# Patient Record
Sex: MALE | Race: BLACK OR AFRICAN AMERICAN | NOT HISPANIC OR LATINO | Employment: FULL TIME | URBAN - METROPOLITAN AREA
[De-identification: names, ages, dates, MRNs, and addresses within clinical notes are randomized per-mention and may not be internally consistent; named-entity substitution may affect disease eponyms.]

---

## 2024-06-04 ENCOUNTER — HOSPITAL ENCOUNTER (EMERGENCY)
Facility: HOSPITAL | Age: 29
Discharge: HOME | End: 2024-06-04
Attending: EMERGENCY MEDICINE
Payer: COMMERCIAL

## 2024-06-04 VITALS
DIASTOLIC BLOOD PRESSURE: 95 MMHG | HEART RATE: 89 BPM | TEMPERATURE: 98 F | SYSTOLIC BLOOD PRESSURE: 153 MMHG | OXYGEN SATURATION: 100 % | RESPIRATION RATE: 18 BRPM | WEIGHT: 257.94 LBS

## 2024-06-04 DIAGNOSIS — R21 RASH AND NONSPECIFIC SKIN ERUPTION: Primary | ICD-10-CM

## 2024-06-04 PROCEDURE — 63600175 PHARM REV CODE 636 W HCPCS: Performed by: NURSE PRACTITIONER

## 2024-06-04 PROCEDURE — 99284 EMERGENCY DEPT VISIT MOD MDM: CPT

## 2024-06-04 RX ORDER — METHYLPREDNISOLONE 4 MG/1
TABLET ORAL
Qty: 21 EACH | Refills: 0 | Status: SHIPPED | OUTPATIENT
Start: 2024-06-04

## 2024-06-04 RX ORDER — PREDNISONE 10 MG/1
20 TABLET ORAL
Status: COMPLETED | OUTPATIENT
Start: 2024-06-04 | End: 2024-06-04

## 2024-06-04 RX ORDER — HYDROXYZINE HYDROCHLORIDE 25 MG/1
25 TABLET, FILM COATED ORAL 4 TIMES DAILY PRN
Qty: 28 TABLET | Refills: 0 | Status: SHIPPED | OUTPATIENT
Start: 2024-06-04 | End: 2024-06-11

## 2024-06-04 RX ADMIN — PREDNISONE 20 MG: 10 TABLET ORAL at 05:06

## 2024-06-04 NOTE — Clinical Note
"Steven Beyjody" Awilda was seen and treated in our emergency department on 6/4/2024.  He may return to work on 06/06/2024.       If you have any questions or concerns, please don't hesitate to call.      Carlos Mays Jr., FNP"

## 2024-06-04 NOTE — ED PROVIDER NOTES
Encounter Date: 6/4/2024       History     Chief Complaint   Patient presents with    Rash     ITCHY RASH TO CHEST AFTER SPRAYING AXE DEODERANT.      Pt is a 29 y.o. male who presents to the [facility] ED complaining of a rash to his chest after he sprayed the area with a new fragrance of axe body spray on Friday. Says symptoms began later that night and have continued until today. Reports symptoms were improving until he went to work and got sweaty which worsened the area. Denies swelling to lips/tongue, SOB, fever, chest pain, abdominal pain, or loss of bowel or bladder control.       Review of patient's allergies indicates:  No Known Allergies  History reviewed. No pertinent past medical history.  History reviewed. No pertinent surgical history.  No family history on file.  Social History     Tobacco Use    Smoking status: Never    Smokeless tobacco: Never     Review of Systems   Constitutional:  Negative for chills, diaphoresis, fatigue and fever.   HENT:  Negative for facial swelling, postnasal drip, rhinorrhea, sinus pressure, sinus pain, sore throat and trouble swallowing.    Respiratory:  Negative for cough, chest tightness, shortness of breath and wheezing.    Cardiovascular:  Negative for chest pain, palpitations and leg swelling.   Gastrointestinal:  Negative for abdominal pain, diarrhea, nausea and vomiting.   Genitourinary:  Negative for dysuria, flank pain, hematuria and urgency.   Musculoskeletal:  Negative for arthralgias, back pain and myalgias.   Skin:  Positive for rash. Negative for color change.   Neurological:  Negative for dizziness, syncope, weakness and headaches.   Hematological:  Does not bruise/bleed easily.   All other systems reviewed and are negative.      Physical Exam     Initial Vitals [06/04/24 1640]   BP Pulse Resp Temp SpO2   (!) 153/95 89 18 97.9 °F (36.6 °C) 100 %      MAP       --         Physical Exam    Nursing note and vitals reviewed.  Constitutional: Vital signs are normal. He  appears well-developed and well-nourished.   HENT:   Head: Normocephalic.   Nose: Nose normal.   Mouth/Throat: Oropharynx is clear and moist.   Eyes: Conjunctivae and EOM are normal. Pupils are equal, round, and reactive to light.   Neck: Neck supple.   Normal range of motion.  Cardiovascular:  Normal rate, regular rhythm, normal heart sounds and intact distal pulses.           Pulmonary/Chest: Effort normal and breath sounds normal. No respiratory distress. He has no wheezes. He has no rhonchi. He has no rales. He exhibits no tenderness.   Abdominal: Abdomen is soft and flat. Bowel sounds are normal. There is no abdominal tenderness. There is no rebound, no guarding, no tenderness at McBurney's point and negative Cook's sign.   Musculoskeletal:         General: Normal range of motion.      Cervical back: Normal range of motion and neck supple.     Neurological: He is alert and oriented to person, place, and time. He has normal strength.   Skin: Skin is warm and dry. Capillary refill takes less than 2 seconds. Rash noted.        Psychiatric: He has a normal mood and affect. His behavior is normal. Judgment and thought content normal.         ED Course   Procedures  Labs Reviewed - No data to display       Imaging Results    None          Medications   predniSONE tablet 20 mg (has no administration in time range)     Medical Decision Making  Differential:  Contact dermatitis  Skin rash  Allergic reaction               ED Course as of 06/04/24 1656 Tue Jun 04, 2024 1655 Given strict ED return precautions. I have spoken with the patient and/or caregivers. I have explained the patient's condition, diagnoses and treatment plan based on the information available to me at this time. I have answered the patient's and/or caregiver's questions and addressed any concerns. The patient and/or caregivers have as good an understanding of the patient's diagnosis, condition and treatment plan as can be expected at this point.  The vital signs have been stable. The patient's condition is stable and appropriate for discharge from the emergency department.      The patient will pursue further outpatient evaluation with the primary care physician or other designated or consulting physician as outlined in the discharge instructions. The patient and/or caregivers are agreeable to this plan of care and follow-up instructions have been explained in detail. The patient and/or caregivers have received these instructions in written format and have expressed an understanding of the discharge instructions. The patient and/or caregivers are aware that any significant change in condition or worsening of symptoms should prompt an immediate return to this or the closest emergency department or a call to 911.   [JA]      ED Course User Index  [JA] Carlos Mays Jr., FNP                           Clinical Impression:  Final diagnoses:  [R21] Rash and nonspecific skin eruption (Primary)          ED Disposition Condition    Discharge Stable          ED Prescriptions       Medication Sig Dispense Start Date End Date Auth. Provider    hydrOXYzine HCL (ATARAX) 25 MG tablet Take 1 tablet (25 mg total) by mouth 4 (four) times daily as needed for Itching. 28 tablet 6/4/2024 6/11/2024 Carlos Mays Jr., FNP    methylPREDNISolone (MEDROL DOSEPACK) 4 mg tablet use as directed 21 each 6/4/2024 -- Carlos Mays Jr., FNP          Follow-up Information       Follow up With Specialties Details Why Contact Info    [facility] Emergency Medicine In 3 days As needed, If symptoms worsen [address] [facility] [address] [phone]             Carlos Mays Jr., FNP  06/04/24 8256

## 2024-06-25 ENCOUNTER — OFFICE VISIT (OUTPATIENT)
Dept: URGENT CARE | Facility: CLINIC | Age: 29
End: 2024-06-25
Attending: STUDENT IN AN ORGANIZED HEALTH CARE EDUCATION/TRAINING PROGRAM
Payer: COMMERCIAL

## 2024-06-25 ENCOUNTER — HOSPITAL ENCOUNTER (EMERGENCY)
Facility: HOSPITAL | Age: 29
Discharge: HOME | End: 2024-06-25
Attending: STUDENT IN AN ORGANIZED HEALTH CARE EDUCATION/TRAINING PROGRAM
Payer: COMMERCIAL

## 2024-06-25 VITALS
TEMPERATURE: 99 F | WEIGHT: 257.94 LBS | OXYGEN SATURATION: 100 % | TEMPERATURE: 98 F | DIASTOLIC BLOOD PRESSURE: 83 MMHG | HEART RATE: 88 BPM | HEIGHT: 71 IN | RESPIRATION RATE: 18 BRPM | HEART RATE: 94 BPM | WEIGHT: 255.75 LBS | BODY MASS INDEX: 36.93 KG/M2 | SYSTOLIC BLOOD PRESSURE: 145 MMHG | OXYGEN SATURATION: 99 % | BODY MASS INDEX: 35.81 KG/M2 | RESPIRATION RATE: 18 BRPM | SYSTOLIC BLOOD PRESSURE: 140 MMHG | HEIGHT: 70 IN | DIASTOLIC BLOOD PRESSURE: 80 MMHG

## 2024-06-25 DIAGNOSIS — R30.0 DYSURIA: Primary | ICD-10-CM

## 2024-06-25 DIAGNOSIS — Z00.00 ENCOUNTER FOR MEDICAL EXAMINATION TO ESTABLISH CARE: ICD-10-CM

## 2024-06-25 DIAGNOSIS — Z11.3 SCREENING FOR STD (SEXUALLY TRANSMITTED DISEASE): Primary | ICD-10-CM

## 2024-06-25 DIAGNOSIS — Z20.2 STD EXPOSURE: ICD-10-CM

## 2024-06-25 DIAGNOSIS — Z13.9 ENCOUNTER FOR MEDICAL SCREENING EXAMINATION: ICD-10-CM

## 2024-06-25 LAB
C TRACH DNA SPEC QL NAA+PROBE: NOT DETECTED
N GONORRHOEA DNA SPEC QL NAA+PROBE: NOT DETECTED
SOURCE (OHS): NORMAL

## 2024-06-25 PROCEDURE — 87491 CHLMYD TRACH DNA AMP PROBE: CPT

## 2024-06-25 PROCEDURE — 87591 N.GONORRHOEAE DNA AMP PROB: CPT

## 2024-06-25 PROCEDURE — 87661 TRICHOMONAS VAGINALIS AMPLIF: CPT

## 2024-06-25 PROCEDURE — 99213 OFFICE O/P EST LOW 20 MIN: CPT | Mod: S$PBB,,,

## 2024-06-25 PROCEDURE — 99281 EMR DPT VST MAYX REQ PHY/QHP: CPT | Mod: 27

## 2024-06-25 PROCEDURE — 99214 OFFICE O/P EST MOD 30 MIN: CPT | Mod: PBBFAC

## 2024-06-25 NOTE — PROGRESS NOTES
"Subjective:       Patient ID: Steven Kaiser is a 29 y.o. male.    Vitals:  height is 5' 10" (1.778 m) and weight is 117 kg (257 lb 15 oz). His temperature is 98.6 °F (37 °C). His blood pressure is 145/83 (abnormal) and his pulse is 94. His respiration is 18 and oxygen saturation is 99%.     Chief Complaint: std screen and Referral (REQUESTS PCP)    29-year-old male reports to the clinic requesting a referral for primary care provider to establish care, and he would like STD screening at this time.  Patient states that on Sunday night while he was working he had burning with urination, but burning subsided in a few hours and was gone by Monday morning.  Patient states he has been exposed to an STD in the past although he is unsure which 1, but was given an antibiotic by mouth for treatment and has not had any problems since.  Discussed testing patient's urine for gonorrhea, chlamydia, and Trichomonas.  Patient agrees to this plan and does not wish to have hepatitis, syphilis, or HIV testing via blood done at today's visit.    All other systems are negative    Chart reviewed    Objective:   Physical Exam   Constitutional: He appears well-developed.  Non-toxic appearance. He does not appear ill. No distress.   Cardiovascular: Regular rhythm and normal heart sounds.   Pulmonary/Chest: Effort normal and breath sounds normal.   Abdominal: Bowel sounds are normal. He exhibits no distension. Soft. There is no abdominal tenderness.   Musculoskeletal: Normal range of motion.         General: Normal range of motion.   Skin: Skin is warm, dry and not diaphoretic.   Nursing note and vitals reviewed.        Assessment:     1. Screening for STD (sexually transmitted disease)    2. Encounter for medical examination to establish care            Plan:   Take medicines exactly as prescribed.  If your doctor prescribed antibiotics, take them as directed. Don't stop taking them just because you feel better. You need to take the full " course of antibiotics.  Tell your sex partner(s) that they will need treatment. For certain STIs, your doctor may be able to prescribe treatment for your partner(s) also.  Don't have sexual contact while you have symptoms of an STI or are being treated for an STI.  Don't douche. Douching changes the normal balance of bacteria in your vagina. It may increase the risk of spreading the infection to your uterus or other reproductive organs.    Here are some ways to help prevent STIs.  Limit your sex partners. Sex with one partner who has sex only with you can reduce your risk of getting an STI.  Talk with your partner or partners about STIs before you have sex. Find out if they are at risk for an STI. Remember that it's possible to have an STI and not know it.  Wait to have sex with new partners until you've each been tested.  Don't have sex if you have symptoms of an infection or if you are being treated for an STI.  Use a condom every time you have sex. Condoms are the only form of birth control that also helps prevent STIs.  Don't share sex toys. But if you do share them, use a condom and clean the sex toys between each use.  Vaccines are available for some STIs, such as HPV. Ask your doctor for more information.      Seek immediate medical care if:  You have new belly or pelvic pain.  You have a fever.  You have new or increased burning or pain with urination, or you cannot urinate.  You have pain, swelling, or tenderness in the scrotum.  You are pregnant and have any symptoms of an STI.  Watch closely for changes in your health, and be sure to contact your doctor or nurse advice line if:  You have unusual vaginal bleeding.  You have a discharge from the vagina, penis, or anus.  You have any new symptoms, such as sores, bumps, rashes, blisters, or warts in the genital or anal area.  You have itching, tingling, pain, or burning in the genital or anal area.  You think you may have been exposed to an STI.  You have a  sore throat or sores in your mouth or on your tongue.      Screening for STD (sexually transmitted disease)  -     Chlamydia/GC, PCR  -     Trichomonas vaginalis Amplified RNA    Encounter for medical examination to establish care  -     Ambulatory referral/consult to Family Practice        Please note: This chart was completed via voice to text dictation. It may contain typographical/word recognition errors. If there are any questions, please contact the provider for final clarification.       Curettage Text: The wound bed was treated with curettage after the biopsy was performed.

## 2024-06-25 NOTE — ED PROVIDER NOTES
Encounter Date: 6/25/2024       History     Chief Complaint   Patient presents with    Dysuria     PT REPORTS DYSURIA LAST PM, NOT THIS AM.  NO URETHRAL DC BUT UNSURE IF EXPOSED TO STD.       Patient dysuria intermittently for about 1 week. Today he has no symptoms besides intermittent penile shaft pain. He has a possible exposure to an STD.     The history is provided by the patient. No  was used.     Review of patient's allergies indicates:  No Known Allergies  History reviewed. No pertinent past medical history.  History reviewed. No pertinent surgical history.  No family history on file.  Social History     Tobacco Use    Smoking status: Never    Smokeless tobacco: Never     Review of Systems   Constitutional:  Negative for fever.   HENT:  Negative for sore throat.    Respiratory:  Negative for shortness of breath.    Cardiovascular:  Negative for chest pain.   Gastrointestinal:  Negative for nausea.   Genitourinary:  Positive for dysuria.   Musculoskeletal:  Negative for back pain.   Skin:  Negative for rash.   Neurological:  Negative for weakness.   Hematological:  Does not bruise/bleed easily.       Physical Exam     Initial Vitals [06/25/24 1006]   BP Pulse Resp Temp SpO2   (!) 142/90 101 16 97.9 °F (36.6 °C) 100 %      MAP       --         Physical Exam    Nursing note and vitals reviewed.  Constitutional: He appears well-developed and well-nourished. No distress.   HENT:   Head: Normocephalic and atraumatic.   Eyes: Pupils are equal, round, and reactive to light.   Neck: Neck supple.   Normal range of motion.  Cardiovascular:  Normal rate, regular rhythm, normal heart sounds and intact distal pulses.           Pulmonary/Chest: Breath sounds normal.   Abdominal: Abdomen is soft. Bowel sounds are normal.   Musculoskeletal:         General: Normal range of motion.      Cervical back: Normal range of motion and neck supple.     Neurological: He is alert and oriented to person, place, and  time. He has normal strength. GCS score is 15. GCS eye subscore is 4. GCS verbal subscore is 5. GCS motor subscore is 6.   Skin: Skin is warm and dry.   Psychiatric: Thought content normal.         ED Course   Procedures  Labs Reviewed - No data to display       Imaging Results    None          Medications - No data to display  Medical Decision Making  Pateint had MSE and is deemed medically stable. He was referred to urgent care for UA and STD testing    Risk  Risk Details: Risk Details: Given strict ED return precautions. I have spoken with the patient and/or caregivers. I have explained the patient's condition, diagnoses and treatment plan based on the information available to me at this time. I have answered the patient's and/or caregiver's questions and addressed any concerns. The patient and/or caregivers have as good an understanding of the patient's diagnosis, condition and treatment plan as can be expected at this point. The vital signs have been stable. The patient's condition is stable and appropriate for discharge from the emergency department.      The patient will pursue further outpatient evaluation with the primary care physician or other designated or consulting physician as outlined in the discharge instructions. The patient and/or caregivers are agreeable to this plan of care and follow-up instructions have been explained in detail. The patient and/or caregivers have received these instructions in written format and have expressed an understanding of the discharge instructions. The patient and/or caregivers are aware that any significant change in condition or worsening of symptoms should prompt an immediate return to this or the closest emergency department or a call to 911.           Additional MDM:   Differential Diagnosis:   Urethritis, cystitis, pyelonephritis, herpes, candidiasis, among others                                   It is important that you follow up with your primary care  provider or specialist if indicated for further evaluation, workup, and treatment as necessary. The exam and treatment you received in Emergency Department was for an urgent problem and NOT INTENDED AS COMPLETE CARE. It is important that you FOLLOW UP with a doctor for ongoing care. If your symptoms become WORSE or you DO NOT IMPROVE and you are unable to reach your health care provider, you should RETURN to the Emergency Department        Clinical Impression:  Final diagnoses:  [R30.0] Dysuria (Primary)  [Z20.2] STD exposure  [Z13.9] Encounter for medical screening examination          ED Disposition Condition    Discharge Stable          ED Prescriptions    None       Follow-up Information       Follow up With Specialties Details Why Contact Info Additional Information    [facility] today  [address] [facility] [address] [phone] [address] [address]             Ceci Mark, [facility]  06/25/24 4030

## 2024-06-27 LAB
SPECIMEN SOURCE: NORMAL
T VAGINALIS RRNA SPEC QL NAA+PROBE: NEGATIVE

## 2024-12-28 ENCOUNTER — ON-DEMAND VIRTUAL (OUTPATIENT)
Dept: URGENT CARE | Facility: CLINIC | Age: 29
End: 2024-12-28
Payer: COMMERCIAL

## 2024-12-28 DIAGNOSIS — N50.89 LUMP IN THE TESTICLE: Primary | ICD-10-CM

## 2024-12-28 PROCEDURE — 99212 OFFICE O/P EST SF 10 MIN: CPT | Mod: 95,,, | Performed by: PHYSICIAN ASSISTANT

## 2024-12-28 NOTE — PATIENT INSTRUCTIONS
Follow up in person at an urgent care  For severe pain go to the ER    Thank you for choosing [facility]!    Our goal in the [facility] to always provide outstanding medical care. You may receive a survey by mail or e-mail in the next week regarding your experience today. We would greatly appreciate you completing and returning the survey. Your feedback provides us with a way to recognize our staff who provide very good care, and it helps us learn how to improve when your experience was below our aspiration of excellence.         We appreciate you trusting us with your medical care. We hope you feel better soon. We will be happy to take care of you for all of your future medical needs.    You must understand that you've received Virtual  treatment only and that you may be released before all your medical problems are known or treated. You, the patient, will arrange for follow up care as instructed.    Follow up with your PCP or specialty clinic as directed in the next 1-2 weeks if not improved or as needed.  You can call [phone] to schedule an appointment with the appropriate provider.    If your condition worsens we recommend that you receive another evaluation in person, with your primary care provider, urgent care or at the emergency room immediately or contact your primary medical clinics after hours call service to discuss your concerns.        
home

## 2024-12-28 NOTE — PROGRESS NOTES
Subjective:      Patient ID: Steven Kaiser is a 29 y.o. male.    Vitals:  vitals were not taken for this visit.     Chief Complaint: Testicle Pain      Visit Type: TELE AUDIOVISUAL    Present with the patient at the time of consultation: TELEMED PRESENT WITH PATIENT: None at home    History reviewed. No pertinent past medical history.  History reviewed. No pertinent surgical history.  Review of patient's allergies indicates:  No Known Allergies  Current Outpatient Medications on File Prior to Visit   Medication Sig Dispense Refill    methylPREDNISolone (MEDROL DOSEPACK) 4 mg tablet use as directed (Patient not taking: Reported on 6/25/2024) 21 each 0     No current facility-administered medications on file prior to visit.     Family History   Problem Relation Name Age of Onset    Diabetes type I Mother      Hyperlipidemia Father             Ohs Peq Odvv Intake    12/28/2024  9:35 AM CST - Filed by Patient   What is your current physical address in the event of a medical emergency? [address]   Are you able to take your vital signs? No   Please attach any relevant images or files    Is your employer contracted with [facility]? No         Lump in testicle. Has been recovering from flu and does not know if it is from coughing or infection. Hx of epididymitis in the past. Lump is slightly tender.     Testicle Pain  The patient's primary symptoms include scrotal swelling and testicular pain. The patient's pertinent negatives include no penile discharge. This is a new problem. Associated symptoms include coughing.       Respiratory:  Positive for cough.    Genitourinary:  Positive for scrotal swelling and testicular pain. Negative for genital trauma and penile discharge.        Objective:   The physical exam was conducted virtually.  Physical Exam   Abdominal: Normal appearance.   Neurological: He is alert.       Assessment:     1. Lump in the testicle        Plan:       Lump in the  testicle      Patient should go to urgent care for this complaint to get exam.

## 2025-04-02 ENCOUNTER — OFFICE VISIT (OUTPATIENT)
Dept: PRIMARY CARE CLINIC | Facility: CLINIC | Age: 30
End: 2025-04-02
Payer: COMMERCIAL

## 2025-04-02 VITALS
HEART RATE: 74 BPM | OXYGEN SATURATION: 98 % | DIASTOLIC BLOOD PRESSURE: 87 MMHG | SYSTOLIC BLOOD PRESSURE: 135 MMHG | BODY MASS INDEX: 37.02 KG/M2 | WEIGHT: 258.63 LBS | TEMPERATURE: 98 F | HEIGHT: 70 IN | RESPIRATION RATE: 18 BRPM

## 2025-04-02 DIAGNOSIS — E66.812 CLASS 2 OBESITY WITHOUT SERIOUS COMORBIDITY WITH BODY MASS INDEX (BMI) OF 37.0 TO 37.9 IN ADULT, UNSPECIFIED OBESITY TYPE: Chronic | ICD-10-CM

## 2025-04-02 DIAGNOSIS — Z23 NEED FOR DIPHTHERIA-TETANUS-PERTUSSIS (TDAP) VACCINE: ICD-10-CM

## 2025-04-02 DIAGNOSIS — Z11.4 ENCOUNTER FOR SCREENING FOR HIV: ICD-10-CM

## 2025-04-02 DIAGNOSIS — Z76.89 ENCOUNTER TO ESTABLISH CARE WITH NEW DOCTOR: Primary | ICD-10-CM

## 2025-04-02 DIAGNOSIS — Z00.00 WELLNESS EXAMINATION: Chronic | ICD-10-CM

## 2025-04-02 DIAGNOSIS — M72.2 PLANTAR FASCIITIS OF LEFT FOOT: Chronic | ICD-10-CM

## 2025-04-02 DIAGNOSIS — Z11.59 ENCOUNTER FOR HEPATITIS C SCREENING TEST FOR LOW RISK PATIENT: ICD-10-CM

## 2025-04-02 RX ORDER — MELOXICAM 15 MG/1
15 TABLET ORAL DAILY
Qty: 14 TABLET | Refills: 0 | Status: SHIPPED | OUTPATIENT
Start: 2025-04-02 | End: 2025-04-16

## 2025-04-02 RX ORDER — METHYLPREDNISOLONE 4 MG/1
TABLET ORAL
Qty: 21 EACH | Refills: 0 | Status: SHIPPED | OUTPATIENT
Start: 2025-04-02 | End: 2025-04-23

## 2025-04-02 NOTE — PATIENT INSTRUCTIONS
"I also recommend the following lifestyle changes:  1. Avoid saturated fats: Examples include butter, full fat dairy products (like cheese, ice cream), red meat, processed meat (like sausage, bologna), baked goods (like biscuits, cookies).  2. Increase dietary fiber: Examples include apples, berries, whole grains, beans, etc.  3. Increase omega-3 fat intake: Examples include some fish (like tuna, salmon), olive or canola oil, nuts/grains (like flaxseed, walnuts, gavin seeds)  4. Exercise lowers bad cholesterol (LDL) and increases good cholesterol (HDL).    More information can be found by researching the "DASH Diet" or "Mediterranean Diet".    "

## 2025-04-02 NOTE — PROGRESS NOTES
ANNUAL WELLNESS NOTE    Chief Complaint:  Establish Care     HPI:  Steven Kaiser is a 30 y.o. male    No past medical history on file.    Patient Care Team:  Regina Goodwin MD as PCP - General (Internal Medicine)    Presents today to establish care and for wellness visit. Describes overall health as good. Diet is not healthy - work in progress. Exercises never. Tobacco use: never. Alcohol use: never. Caffeine intake: minimal/rare. Eye exam: annually (wears glasses). Dental exam: overdue. Not fasting, will get wellness labs soon. Endorsed left foot heel/ankle pain. Works at the [employer], stands for long periods of time.     Review of Systems   Constitutional:  Negative for chills and fever.   Respiratory:  Negative for cough.    Cardiovascular:  Negative for chest pain.   Gastrointestinal:  Negative for abdominal pain and vomiting.   Genitourinary:  Negative for dysuria and hematuria.   Musculoskeletal:         Left foot/heel/ankle pain    Psychiatric/Behavioral:  Negative for dysphoric mood.      Physical Exam  Vitals and nursing note reviewed.   Constitutional:       General: He is not in acute distress.     Appearance: Normal appearance. He is not ill-appearing.   HENT:      Nose: No rhinorrhea.      Mouth/Throat:      Mouth: Mucous membranes are moist.   Eyes:      General: No scleral icterus.     Conjunctiva/sclera: Conjunctivae normal.   Cardiovascular:      Rate and Rhythm: Normal rate and regular rhythm.      Pulses: Normal pulses.      Heart sounds: Normal heart sounds. No murmur heard.  Pulmonary:      Effort: Pulmonary effort is normal. No respiratory distress.      Breath sounds: Normal breath sounds. No wheezing.   Abdominal:      Palpations: Abdomen is soft.      Tenderness: There is no abdominal tenderness.   Musculoskeletal:      Right lower leg: No edema.      Left lower leg: No edema.   Skin:     General: Skin is warm.      Coloration: Skin is not jaundiced.   Neurological:       Mental Status: He is alert. Mental status is at baseline.      Gait: Gait normal.   Psychiatric:         Mood and Affect: Mood normal.         Behavior: Behavior normal.       Assessment/Plan:  1. Encounter to establish care with new doctor  Comments:  Reviewed medical history, no chronic medications  Ordered Wellness Labs   Requested records from previous providers/specialists    2. Need for diphtheria-tetanus-pertussis (Tdap) vaccine  -     Tdap (BOOSTRIX) vaccine injection 0.5 mL    3. Wellness examination  Assessment & Plan:  Health Maintenance:  Routine Health Maintenance   Exercises as tolerated, >30 minutes a day for 3-5 days a week.   Counseled patient on compliance with medications and healthy diet.   Monthly Testicular Exams Encouraged.     Age-appropriate Screening  Vaccinations:   - Flu: Postponed, patient's preference    - Pneumonia: N/A   - Shingles (>50): N/A   - Tdap:Given today     - COVID: 2 dose series completed    Screening:   - Prostate (>45): N/A   - Lung Ca. Screening (50-80 with >20 pack yrs current or quit <15 yrs): N/A   - Colon Ca. Screening (>45): N/A   - AAA (65-75 if ever smoked): N/A   - Hep. C, HIV: Ordered today     Orders:  -     CBC Auto Differential; Future; Expected date: 04/02/2025  -     Comprehensive Metabolic Panel; Future; Expected date: 04/02/2025  -     TSH; Future; Expected date: 04/02/2025  -     Lipid Panel; Future; Expected date: 04/02/2025  -     Urinalysis; Future; Expected date: 04/02/2025  -     Hemoglobin A1C; Future; Expected date: 04/02/2025    4. Encounter for screening for HIV  -     HIV 1/2 Ag/Ab (4th Gen); Future; Expected date: 04/02/2025    5. Encounter for hepatitis C screening test for low risk patient  -     Hepatitis C Antibody; Future; Expected date: 04/02/2025    6. Plantar fasciitis of left foot  Assessment & Plan:  Worsening   Obtain XR of left foot  Start Mobic for 7-14 days consistently, no other NSAIDs  OK to alternate with Tylenol (up to  3,000mg daily)   Prescribed medrol dos brett   Consider podiatry referral     Orders:  -     X-Ray Foot Complete Left; Future; Expected date: 04/02/2025  -     meloxicam (MOBIC) 15 MG tablet; Take 1 tablet (15 mg total) by mouth once daily. for 14 days  Dispense: 14 tablet; Refill: 0  -     methylPREDNISolone (MEDROL DOSEPACK) 4 mg tablet; use as directed  Dispense: 21 each; Refill: 0    7. Class 2 obesity without serious comorbidity with body mass index (BMI) of 37.0 to 37.9 in adult, unspecified obesity type  Assessment & Plan:  Encouraged healthy lifestyle/diet modifications   Exercise 3-5x a week (>30 minutes, including a variety of cardio, weightbearing and lifting exercises)   Eat a well balanced diet comprised of fruit/vegetables, lean protein, and complex carbs       Follow up in about 3 weeks (around 4/23/2025) for Lab Results.

## 2025-04-04 ENCOUNTER — RESULTS FOLLOW-UP (OUTPATIENT)
Dept: PRIMARY CARE CLINIC | Facility: CLINIC | Age: 30
End: 2025-04-04

## 2025-04-04 ENCOUNTER — LAB VISIT (OUTPATIENT)
Dept: LAB | Facility: HOSPITAL | Age: 30
End: 2025-04-04
Attending: STUDENT IN AN ORGANIZED HEALTH CARE EDUCATION/TRAINING PROGRAM
Payer: COMMERCIAL

## 2025-04-04 DIAGNOSIS — Z11.4 ENCOUNTER FOR SCREENING FOR HIV: ICD-10-CM

## 2025-04-04 DIAGNOSIS — Z11.59 ENCOUNTER FOR HEPATITIS C SCREENING TEST FOR LOW RISK PATIENT: ICD-10-CM

## 2025-04-04 DIAGNOSIS — Z00.00 WELLNESS EXAMINATION: ICD-10-CM

## 2025-04-04 PROBLEM — E66.9 OBESITY: Status: ACTIVE | Noted: 2025-04-04

## 2025-04-04 PROBLEM — M72.2 PLANTAR FASCIITIS OF LEFT FOOT: Status: ACTIVE | Noted: 2025-04-04

## 2025-04-04 PROBLEM — E66.9 OBESITY: Chronic | Status: ACTIVE | Noted: 2025-04-04

## 2025-04-04 PROBLEM — M72.2 PLANTAR FASCIITIS OF LEFT FOOT: Chronic | Status: ACTIVE | Noted: 2025-04-04

## 2025-04-04 LAB
ALBUMIN SERPL-MCNC: 4.4 G/DL (ref 3.5–5)
ALBUMIN/GLOB SERPL: 1.2 RATIO (ref 1.1–2)
ALP SERPL-CCNC: 65 UNIT/L (ref 40–150)
ALT SERPL-CCNC: 39 UNIT/L (ref 0–55)
AMORPH URATE CRY URNS QL MICRO: ABNORMAL /UL
ANION GAP SERPL CALC-SCNC: 9 MEQ/L
AST SERPL-CCNC: 18 UNIT/L (ref 11–45)
BACTERIA #/AREA URNS AUTO: ABNORMAL /HPF
BASOPHILS # BLD AUTO: 0.04 X10(3)/MCL
BASOPHILS NFR BLD AUTO: 0.5 %
BILIRUB SERPL-MCNC: 0.7 MG/DL
BILIRUB UR QL STRIP.AUTO: NEGATIVE
BUN SERPL-MCNC: 14.2 MG/DL (ref 8.9–20.6)
CALCIUM SERPL-MCNC: 10 MG/DL (ref 8.4–10.2)
CHLORIDE SERPL-SCNC: 106 MMOL/L (ref 98–107)
CHOLEST SERPL-MCNC: 243 MG/DL
CHOLEST/HDLC SERPL: 5 {RATIO} (ref 0–5)
CLARITY UR: ABNORMAL
CO2 SERPL-SCNC: 27 MMOL/L (ref 22–29)
COLOR UR AUTO: ABNORMAL
CREAT SERPL-MCNC: 0.98 MG/DL (ref 0.72–1.25)
CREAT/UREA NIT SERPL: 14
EOSINOPHIL # BLD AUTO: 0 X10(3)/MCL (ref 0–0.9)
EOSINOPHIL NFR BLD AUTO: 0 %
ERYTHROCYTE [DISTWIDTH] IN BLOOD BY AUTOMATED COUNT: 13.5 % (ref 11.5–17)
EST. AVERAGE GLUCOSE BLD GHB EST-MCNC: 114 MG/DL
GFR SERPLBLD CREATININE-BSD FMLA CKD-EPI: >60 ML/MIN/1.73/M2
GLOBULIN SER-MCNC: 3.7 GM/DL (ref 2.4–3.5)
GLUCOSE SERPL-MCNC: 101 MG/DL (ref 74–100)
GLUCOSE UR QL STRIP: NORMAL
HBA1C MFR BLD: 5.6 %
HCT VFR BLD AUTO: 48.6 % (ref 42–52)
HDLC SERPL-MCNC: 48 MG/DL (ref 35–60)
HGB BLD-MCNC: 15.7 G/DL (ref 14–18)
HGB UR QL STRIP: ABNORMAL
IMM GRANULOCYTES # BLD AUTO: 0.02 X10(3)/MCL (ref 0–0.04)
IMM GRANULOCYTES NFR BLD AUTO: 0.3 %
KETONES UR QL STRIP: NEGATIVE
LDLC SERPL CALC-MCNC: 188 MG/DL (ref 50–140)
LEUKOCYTE ESTERASE UR QL STRIP: NEGATIVE
LYMPHOCYTES # BLD AUTO: 1.3 X10(3)/MCL (ref 0.6–4.6)
LYMPHOCYTES NFR BLD AUTO: 17.2 %
MCH RBC QN AUTO: 31.3 PG (ref 27–31)
MCHC RBC AUTO-ENTMCNC: 32.3 G/DL (ref 33–36)
MCV RBC AUTO: 96.8 FL (ref 80–94)
MONOCYTES # BLD AUTO: 0.33 X10(3)/MCL (ref 0.1–1.3)
MONOCYTES NFR BLD AUTO: 4.4 %
MUCOUS THREADS URNS QL MICRO: ABNORMAL /LPF
NEUTROPHILS # BLD AUTO: 5.88 X10(3)/MCL (ref 2.1–9.2)
NEUTROPHILS NFR BLD AUTO: 77.6 %
NITRITE UR QL STRIP: NEGATIVE
NRBC BLD AUTO-RTO: 0 %
PH UR STRIP: 6 [PH]
PLATELET # BLD AUTO: 249 X10(3)/MCL (ref 130–400)
PMV BLD AUTO: 11.3 FL (ref 7.4–10.4)
POTASSIUM SERPL-SCNC: 4.3 MMOL/L (ref 3.5–5.1)
PROT SERPL-MCNC: 8.1 GM/DL (ref 6.4–8.3)
PROT UR QL STRIP: NEGATIVE
RBC # BLD AUTO: 5.02 X10(6)/MCL (ref 4.7–6.1)
RBC #/AREA URNS AUTO: ABNORMAL /HPF
SODIUM SERPL-SCNC: 142 MMOL/L (ref 136–145)
SP GR UR STRIP.AUTO: 1.03 (ref 1–1.03)
SQUAMOUS #/AREA URNS LPF: ABNORMAL /HPF
TRIGL SERPL-MCNC: 36 MG/DL (ref 34–140)
TSH SERPL-ACNC: 0.71 UIU/ML (ref 0.35–4.94)
UROBILINOGEN UR STRIP-ACNC: NORMAL
VLDLC SERPL CALC-MCNC: 7 MG/DL
WBC # BLD AUTO: 7.57 X10(3)/MCL (ref 4.5–11.5)
WBC #/AREA URNS AUTO: ABNORMAL /HPF

## 2025-04-04 PROCEDURE — 85025 COMPLETE CBC W/AUTO DIFF WBC: CPT

## 2025-04-04 PROCEDURE — 80061 LIPID PANEL: CPT

## 2025-04-04 PROCEDURE — 87389 HIV-1 AG W/HIV-1&-2 AB AG IA: CPT

## 2025-04-04 PROCEDURE — 83036 HEMOGLOBIN GLYCOSYLATED A1C: CPT

## 2025-04-04 PROCEDURE — 84443 ASSAY THYROID STIM HORMONE: CPT

## 2025-04-04 PROCEDURE — 86803 HEPATITIS C AB TEST: CPT

## 2025-04-04 PROCEDURE — 36415 COLL VENOUS BLD VENIPUNCTURE: CPT

## 2025-04-04 PROCEDURE — 81015 MICROSCOPIC EXAM OF URINE: CPT

## 2025-04-04 PROCEDURE — 80053 COMPREHEN METABOLIC PANEL: CPT

## 2025-04-05 LAB
HCV AB SERPL QL IA: NONREACTIVE
HIV 1+2 AB+HIV1 P24 AG SERPL QL IA: NONREACTIVE

## 2025-04-05 NOTE — ASSESSMENT & PLAN NOTE
Encouraged healthy lifestyle/diet modifications   Exercise 3-5x a week (>30 minutes, including a variety of cardio, weightbearing and lifting exercises)   Eat a well balanced diet comprised of fruit/vegetables, lean protein, and complex carbs

## 2025-04-05 NOTE — ASSESSMENT & PLAN NOTE
Worsening   Obtain XR of left foot  Start Mobic for 7-14 days consistently, no other NSAIDs  OK to alternate with Tylenol (up to 3,000mg daily)   Prescribed medrol dos brett   Consider podiatry referral

## 2025-04-05 NOTE — ASSESSMENT & PLAN NOTE
Health Maintenance:  Routine Health Maintenance   Exercises as tolerated, >30 minutes a day for 3-5 days a week.   Counseled patient on compliance with medications and healthy diet.   Monthly Testicular Exams Encouraged.     Age-appropriate Screening  Vaccinations:   - Flu: Postponed, patient's preference    - Pneumonia: N/A   - Shingles (>50): N/A   - Tdap:Given today     - COVID: 2 dose series completed    Screening:   - Prostate (>45): N/A   - Lung Ca. Screening (50-80 with >20 pack yrs current or quit <15 yrs): N/A   - Colon Ca. Screening (>45): N/A   - AAA (65-75 if ever smoked): N/A   - Hep. C, HIV: Ordered today

## 2025-04-23 ENCOUNTER — TELEPHONE (OUTPATIENT)
Dept: PRIMARY CARE CLINIC | Facility: CLINIC | Age: 30
End: 2025-04-23
Payer: COMMERCIAL

## 2025-04-28 ENCOUNTER — OFFICE VISIT (OUTPATIENT)
Dept: PRIMARY CARE CLINIC | Facility: CLINIC | Age: 30
End: 2025-04-28
Payer: COMMERCIAL

## 2025-04-28 VITALS
OXYGEN SATURATION: 97 % | HEART RATE: 73 BPM | TEMPERATURE: 98 F | WEIGHT: 259.38 LBS | BODY MASS INDEX: 37.22 KG/M2 | SYSTOLIC BLOOD PRESSURE: 123 MMHG | DIASTOLIC BLOOD PRESSURE: 83 MMHG | RESPIRATION RATE: 18 BRPM

## 2025-04-28 DIAGNOSIS — R31.9 HEMATURIA, UNSPECIFIED TYPE: ICD-10-CM

## 2025-04-28 DIAGNOSIS — M72.2 PLANTAR FASCIITIS OF LEFT FOOT: Chronic | ICD-10-CM

## 2025-04-28 DIAGNOSIS — R73.09 ELEVATED GLUCOSE: ICD-10-CM

## 2025-04-28 DIAGNOSIS — E78.2 MIXED HYPERLIPIDEMIA: Primary | ICD-10-CM

## 2025-04-28 PROCEDURE — 99214 OFFICE O/P EST MOD 30 MIN: CPT | Mod: ,,, | Performed by: STUDENT IN AN ORGANIZED HEALTH CARE EDUCATION/TRAINING PROGRAM

## 2025-04-28 PROCEDURE — 3044F HG A1C LEVEL LT 7.0%: CPT | Mod: CPTII,,, | Performed by: STUDENT IN AN ORGANIZED HEALTH CARE EDUCATION/TRAINING PROGRAM

## 2025-04-28 PROCEDURE — 3074F SYST BP LT 130 MM HG: CPT | Mod: CPTII,,, | Performed by: STUDENT IN AN ORGANIZED HEALTH CARE EDUCATION/TRAINING PROGRAM

## 2025-04-28 PROCEDURE — 1159F MED LIST DOCD IN RCRD: CPT | Mod: CPTII,,, | Performed by: STUDENT IN AN ORGANIZED HEALTH CARE EDUCATION/TRAINING PROGRAM

## 2025-04-28 PROCEDURE — 3079F DIAST BP 80-89 MM HG: CPT | Mod: CPTII,,, | Performed by: STUDENT IN AN ORGANIZED HEALTH CARE EDUCATION/TRAINING PROGRAM

## 2025-04-28 PROCEDURE — 3008F BODY MASS INDEX DOCD: CPT | Mod: CPTII,,, | Performed by: STUDENT IN AN ORGANIZED HEALTH CARE EDUCATION/TRAINING PROGRAM

## 2025-04-28 PROCEDURE — 1160F RVW MEDS BY RX/DR IN RCRD: CPT | Mod: CPTII,,, | Performed by: STUDENT IN AN ORGANIZED HEALTH CARE EDUCATION/TRAINING PROGRAM

## 2025-04-28 RX ORDER — MELOXICAM 15 MG/1
15 TABLET ORAL DAILY PRN
Qty: 30 TABLET | Refills: 2 | Status: SHIPPED | OUTPATIENT
Start: 2025-04-28

## 2025-06-05 ENCOUNTER — OFFICE VISIT (OUTPATIENT)
Dept: URGENT CARE | Facility: CLINIC | Age: 30
End: 2025-06-05
Payer: COMMERCIAL

## 2025-06-05 VITALS
HEART RATE: 84 BPM | TEMPERATURE: 98 F | BODY MASS INDEX: 37.08 KG/M2 | SYSTOLIC BLOOD PRESSURE: 132 MMHG | RESPIRATION RATE: 18 BRPM | WEIGHT: 259 LBS | DIASTOLIC BLOOD PRESSURE: 87 MMHG | HEIGHT: 70 IN

## 2025-06-05 DIAGNOSIS — R11.10 VOMITING, UNSPECIFIED VOMITING TYPE, UNSPECIFIED WHETHER NAUSEA PRESENT: Primary | ICD-10-CM

## 2025-06-05 PROCEDURE — 99213 OFFICE O/P EST LOW 20 MIN: CPT | Mod: ,,, | Performed by: FAMILY MEDICINE

## 2025-06-05 RX ORDER — ONDANSETRON 4 MG/1
4 TABLET, ORALLY DISINTEGRATING ORAL EVERY 6 HOURS PRN
Qty: 12 TABLET | Refills: 0 | Status: SHIPPED | OUTPATIENT
Start: 2025-06-05 | End: 2025-06-08

## 2025-07-14 ENCOUNTER — PATIENT MESSAGE (OUTPATIENT)
Dept: PRIMARY CARE CLINIC | Facility: CLINIC | Age: 30
End: 2025-07-14
Payer: COMMERCIAL

## 2025-07-16 ENCOUNTER — LAB VISIT (OUTPATIENT)
Dept: LAB | Facility: HOSPITAL | Age: 30
End: 2025-07-16
Attending: STUDENT IN AN ORGANIZED HEALTH CARE EDUCATION/TRAINING PROGRAM
Payer: COMMERCIAL

## 2025-07-16 ENCOUNTER — OFFICE VISIT (OUTPATIENT)
Dept: PRIMARY CARE CLINIC | Facility: CLINIC | Age: 30
End: 2025-07-16
Payer: COMMERCIAL

## 2025-07-16 VITALS
SYSTOLIC BLOOD PRESSURE: 123 MMHG | BODY MASS INDEX: 36.36 KG/M2 | HEIGHT: 70 IN | WEIGHT: 254 LBS | HEART RATE: 84 BPM | OXYGEN SATURATION: 98 % | DIASTOLIC BLOOD PRESSURE: 88 MMHG | RESPIRATION RATE: 18 BRPM | TEMPERATURE: 98 F

## 2025-07-16 DIAGNOSIS — N50.812 PAIN IN LEFT TESTICLE: Primary | ICD-10-CM

## 2025-07-16 LAB
BACTERIA #/AREA URNS AUTO: ABNORMAL /HPF
BILIRUB UR QL STRIP.AUTO: NEGATIVE
C TRACH DNA SPEC QL NAA+PROBE: NOT DETECTED
CLARITY UR: CLEAR
COLOR UR AUTO: ABNORMAL
GLUCOSE UR QL STRIP: NORMAL
HGB UR QL STRIP: ABNORMAL
KETONES UR QL STRIP: NEGATIVE
LEUKOCYTE ESTERASE UR QL STRIP: NEGATIVE
MUCOUS THREADS URNS QL MICRO: ABNORMAL /LPF
N GONORRHOEA DNA SPEC QL NAA+PROBE: NOT DETECTED
NITRITE UR QL STRIP: NEGATIVE
PH UR STRIP: 5.5 [PH]
PROT UR QL STRIP: NEGATIVE
RBC #/AREA URNS AUTO: ABNORMAL /HPF
SP GR UR STRIP.AUTO: 1.03 (ref 1–1.03)
SPECIMEN SOURCE: NORMAL
SQUAMOUS #/AREA URNS LPF: ABNORMAL /HPF
UROBILINOGEN UR STRIP-ACNC: NORMAL
WBC #/AREA URNS AUTO: ABNORMAL /HPF

## 2025-07-16 PROCEDURE — 1159F MED LIST DOCD IN RCRD: CPT | Mod: CPTII,,, | Performed by: NURSE PRACTITIONER

## 2025-07-16 PROCEDURE — 3044F HG A1C LEVEL LT 7.0%: CPT | Mod: CPTII,,, | Performed by: NURSE PRACTITIONER

## 2025-07-16 PROCEDURE — 81015 MICROSCOPIC EXAM OF URINE: CPT

## 2025-07-16 PROCEDURE — 3079F DIAST BP 80-89 MM HG: CPT | Mod: CPTII,,, | Performed by: NURSE PRACTITIONER

## 2025-07-16 PROCEDURE — 96372 THER/PROPH/DIAG INJ SC/IM: CPT | Mod: ,,, | Performed by: NURSE PRACTITIONER

## 2025-07-16 PROCEDURE — 99214 OFFICE O/P EST MOD 30 MIN: CPT | Mod: 25,,, | Performed by: NURSE PRACTITIONER

## 2025-07-16 PROCEDURE — 1160F RVW MEDS BY RX/DR IN RCRD: CPT | Mod: CPTII,,, | Performed by: NURSE PRACTITIONER

## 2025-07-16 PROCEDURE — 87491 CHLMYD TRACH DNA AMP PROBE: CPT

## 2025-07-16 PROCEDURE — 3074F SYST BP LT 130 MM HG: CPT | Mod: CPTII,,, | Performed by: NURSE PRACTITIONER

## 2025-07-16 PROCEDURE — 3008F BODY MASS INDEX DOCD: CPT | Mod: CPTII,,, | Performed by: NURSE PRACTITIONER

## 2025-07-16 RX ORDER — CEFTRIAXONE 500 MG/1
500 INJECTION, POWDER, FOR SOLUTION INTRAMUSCULAR; INTRAVENOUS
Status: COMPLETED | OUTPATIENT
Start: 2025-07-16 | End: 2025-07-16

## 2025-07-16 RX ORDER — DOXYCYCLINE 100 MG/1
100 CAPSULE ORAL 2 TIMES DAILY
Qty: 20 CAPSULE | Refills: 0 | Status: SHIPPED | OUTPATIENT
Start: 2025-07-16

## 2025-07-16 RX ADMIN — CEFTRIAXONE 500 MG: 500 INJECTION, POWDER, FOR SOLUTION INTRAMUSCULAR; INTRAVENOUS at 08:07

## 2025-07-16 NOTE — PROGRESS NOTES
Internal Medicine    Patient ID: [id number]     Chief Complaint: Testicle Pain    HPI:     Steven Kaiser is a 30 y.o. male here today for a clinic visit.     History of Present Illness    CHIEF COMPLAINT:  Patient presents today for left testicular pain    HISTORY OF PRESENT ILLNESS:  He reports left testicular and groin pain that started on Saturday, described as a pulling sensation radiating to the upper groin, most noticeable when sitting. He noted testicles hanging lower than normal on Sunday. Associated lower back pain resolved after a bowel movement. He reports frequent urination during pain episode, approximately every 1-2 hours. He denies swelling, pain with urination, blood in urine, penile discharge, fever, body aches, and chills. He also reports a small, non-painful, palpable lump on the right testicle that is more easily detected after bathing.    PAST MEDICAL HISTORY:  He has a history of recurrent epididymitis, with first episode at age 17-18 where STD testing was negative. Another episode occurred in the summer, which resolved completely with doxycycline treatment at urgent care. He has a history of STDs at age 16-17.    SEXUAL HISTORY:  He is sexually active with females and denies recent STD exposures.          Past Medical History:   Diagnosis Date    Known health problems: none         Past Surgical History:   Procedure Laterality Date    NO PAST SURGERIES          Social History     Tobacco Use    Smoking status: Never    Smokeless tobacco: Never   Substance and Sexual Activity    Alcohol use: Never    Drug use: Never    Sexual activity: Yes     Partners: Female     Birth control/protection: None        Current Outpatient Medications   Medication Instructions    doxycycline (VIBRAMYCIN) 100 mg, Oral, 2 times daily    meloxicam (MOBIC) 15 mg, Oral, Daily PRN       Review of patient's allergies indicates:  No Known Allergies     Patient Care Team:  Regina Goodwin MD as PCP - General  "(Internal Medicine)     Subjective:     Review of Systems    12 point review of systems conducted, negative except as stated in the history of present illness. See HPI for details.    Objective:     Visit Vitals  /88 (BP Location: Right arm, Patient Position: Sitting)   Pulse 84   Temp 98.1 °F (36.7 °C)   Resp 18   Ht 5' 10" (1.778 m)   Wt 115.2 kg (254 lb)   SpO2 98%   BMI 36.45 kg/m²       Physical Exam    Physical Exam    General: No acute distress. Well-developed. Well-nourished.  Eyes: EOMI. Sclerae anicteric.  HENT: Normocephalic. Atraumatic. Nares patent. Moist oral mucosa.  Cardiovascular: Regular rate. Regular rhythm. No murmurs. No rubs. No gallops. Normal S1, S2.  Respiratory: Normal respiratory effort. Clear to auscultation bilaterally. No rales. No rhonchi. No wheezing.  Abdomen: No palpable inguinal hernia. No bulging on left side with bearing down. No bulging on right side with bearing down.  Musculoskeletal: No  obvious deformity.  Extremities: No lower extremity edema.  Neurological: Alert & oriented x3. No slurred speech. Normal gait.  Psychiatric: Normal mood. Normal affect. Good insight. Good judgment.  Skin: Warm. Dry. No rash.  Lymphatics: No lymphadenopathy in groin.  Male Genitourinary: Tenderness to palpation on left testicle. No masses or abnormalities on testicular exam.         Assessment/Plan:     Assessment & Plan    PLAN SUMMARY:  - Ordered ultrasound of testicles and inguinal space  - Ordered urinalysis to rule out kidney stones  - Administered intramuscular injection of Rocephin 500 mg in office  - Prescribed oral Doxycycline to be filled at pharmacy and begun today  - Instructed patient to complete ultrasound and urinalysis, preferably today at [facility]  - Contact office if pain persists after negative ultrasound, as CT of genitourinary tract may be needed    ## EPIDIDYMITIS:  - Patient reports testicular pain radiating into the groin, similar to previous epididymitis " episodes.  - Physical exam revealed no significant swelling or lumps.  - Based on previous diagnosis history and current symptoms, administered intramuscular injection of Rocephin 500 mg in office and prescribed oral Doxycycline to be filled at pharmacy and begun today.  - Explained that positive response to antibiotics would indicate bacterial infection rather than hernia.    ## LEFT TESTICULAR PAIN:  - Patient reports left testicular pain starting on Saturday, radiating to the upper groin.  - Physical exam revealed tenderness but no abnormalities or hernia detected.  - Explained that both inguinal hernias and kidney stones can present with testicular and groin pain.  - Ordered ultrasound of testicles and inguinal space to evaluate for structural abnormalities and potential hernia.  - Also ordered urinalysis to rule out kidney stones.    ## RIGHT TESTICULAR LUMP:  - Patient reports a small lump on the right testicle with no associated pain.  - Physical exam revealed no tenderness or abnormalities on the right side.  - Right testicle will be included in the ordered ultrasound to assess the reported lump.    ## HISTORY OF STDS:  - Noted patient has history of STDs, last occurrence at age 16-17.  - Discussed past STD testing with negative results.  - Current presentation is suspected to be bacterial infection rather than STD recurrence.    ## FOLLOW-UP:  - Instructed patient to complete ultrasound and urinalysis, preferably today at [facility].  - Advised to contact the office if pain persists after negative ultrasound, as CT of genitourinary tract may be needed.          In addition to their scheduled follow up, the patient has also been instructed to follow up on as needed basis.     Future Appointments   Date Time Provider Department Center   10/29/2025  9:20 AM Regina Goodwin MD [facility], FNP    This note was generated with the assistance of ambient listening  technology. Verbal consent was obtained by the patient and accompanying visitor(s) for the recording of patient appointment to facilitate this note. I attest to having reviewed and edited the generated note for accuracy, though some syntax or spelling errors may persist. Please contact the author of this note for any clarification.

## 2025-07-18 ENCOUNTER — TELEPHONE (OUTPATIENT)
Dept: PRIMARY CARE CLINIC | Facility: CLINIC | Age: 30
End: 2025-07-18
Payer: COMMERCIAL

## 2025-07-18 DIAGNOSIS — R10.9 ABDOMINAL PAIN, UNSPECIFIED ABDOMINAL LOCATION: Primary | ICD-10-CM

## 2025-07-18 NOTE — TELEPHONE ENCOUNTER
Pt informed of results, CT order has been placed. Pt confirms understanding of results    ----- Message from PAOLA Siu sent at 7/18/2025  9:23 AM CDT -----  Please inform patient of results.    1. Needs to increase water intake.    2. Negative for bacteria, Gonorrhea/Chlamydia. How are his symptoms? As discussed this could be small kidney stones that he is passing. We can proceed with CT Abd/Pelv without contrast r/o renal   stone.    Thanks for all you do,    Jose    ----- Message -----  From: Lab, Background User  Sent: 7/16/2025   2:10 PM CDT  To: Regina Goodwin MD